# Patient Record
Sex: MALE | Race: WHITE | Employment: FULL TIME | URBAN - METROPOLITAN AREA
[De-identification: names, ages, dates, MRNs, and addresses within clinical notes are randomized per-mention and may not be internally consistent; named-entity substitution may affect disease eponyms.]

---

## 2017-01-30 ENCOUNTER — HOSPITAL ENCOUNTER (OUTPATIENT)
Dept: ULTRASOUND IMAGING | Age: 37
Discharge: HOME | End: 2017-01-30
Attending: FAMILY MEDICINE | Admitting: FAMILY MEDICINE

## 2017-01-30 DIAGNOSIS — B18.9 CHRONIC VIRAL HEPATITIS, UNSPECIFIED VIRAL HEPATITIS TYPE (HCC): ICD-10-CM

## 2017-01-30 DIAGNOSIS — B18.2 CHRONIC VIRAL HEPATITIS C (HCC): ICD-10-CM

## 2019-02-11 ENCOUNTER — OFFICE VISIT (OUTPATIENT)
Dept: PULMONOLOGY | Age: 39
End: 2019-02-11
Payer: COMMERCIAL

## 2019-02-11 VITALS
HEIGHT: 66 IN | BODY MASS INDEX: 19.93 KG/M2 | HEART RATE: 68 BPM | SYSTOLIC BLOOD PRESSURE: 124 MMHG | DIASTOLIC BLOOD PRESSURE: 78 MMHG | OXYGEN SATURATION: 99 % | WEIGHT: 124 LBS

## 2019-02-11 DIAGNOSIS — Z79.891 CHRONIC PRESCRIPTION OPIATE USE: Chronic | ICD-10-CM

## 2019-02-11 DIAGNOSIS — G47.31 CENTRAL SLEEP APNEA: Primary | ICD-10-CM

## 2019-02-11 DIAGNOSIS — K21.9 GERD WITHOUT ESOPHAGITIS: Chronic | ICD-10-CM

## 2019-02-11 PROBLEM — B18.2 CHRONIC HEPATITIS C (HCC): Status: ACTIVE | Noted: 2017-01-03

## 2019-02-11 PROBLEM — E78.5 HYPERLIPIDEMIA: Chronic | Status: ACTIVE | Noted: 2017-01-03

## 2019-02-11 PROBLEM — G47.37 CENTRAL SLEEP APNEA COMORBID WITH PRESCRIBED OPIOID USE: Status: ACTIVE | Noted: 2018-07-05

## 2019-02-11 PROBLEM — F11.90 CENTRAL SLEEP APNEA COMORBID WITH PRESCRIBED OPIOID USE: Status: ACTIVE | Noted: 2018-07-05

## 2019-02-11 PROBLEM — B18.2 CHRONIC HEPATITIS C (HCC): Chronic | Status: ACTIVE | Noted: 2017-01-03

## 2019-02-11 PROBLEM — E78.5 HYPERLIPIDEMIA: Status: ACTIVE | Noted: 2017-01-03

## 2019-02-11 PROCEDURE — G8427 DOCREV CUR MEDS BY ELIG CLIN: HCPCS | Performed by: INTERNAL MEDICINE

## 2019-02-11 PROCEDURE — 99244 OFF/OP CNSLTJ NEW/EST MOD 40: CPT | Performed by: INTERNAL MEDICINE

## 2019-02-11 PROCEDURE — G8484 FLU IMMUNIZE NO ADMIN: HCPCS | Performed by: INTERNAL MEDICINE

## 2019-02-11 PROCEDURE — G8420 CALC BMI NORM PARAMETERS: HCPCS | Performed by: INTERNAL MEDICINE

## 2019-02-11 ASSESSMENT — SLEEP AND FATIGUE QUESTIONNAIRES
NECK CIRCUMFERENCE (INCHES): 13
HOW LIKELY ARE YOU TO NOD OFF OR FALL ASLEEP WHILE WATCHING TV: 3
HOW LIKELY ARE YOU TO NOD OFF OR FALL ASLEEP WHILE LYING DOWN TO REST IN THE AFTERNOON WHEN CIRCUMSTANCES PERMIT: 3
HOW LIKELY ARE YOU TO NOD OFF OR FALL ASLEEP WHILE SITTING INACTIVE IN A PUBLIC PLACE: 3
HOW LIKELY ARE YOU TO NOD OFF OR FALL ASLEEP WHILE SITTING AND TALKING TO SOMEONE: 2
ESS TOTAL SCORE: 23
HOW LIKELY ARE YOU TO NOD OFF OR FALL ASLEEP WHILE SITTING AND READING: 3
HOW LIKELY ARE YOU TO NOD OFF OR FALL ASLEEP WHILE SITTING QUIETLY AFTER LUNCH WITHOUT ALCOHOL: 3
HOW LIKELY ARE YOU TO NOD OFF OR FALL ASLEEP IN A CAR, WHILE STOPPED FOR A FEW MINUTES IN TRAFFIC: 3
HOW LIKELY ARE YOU TO NOD OFF OR FALL ASLEEP WHEN YOU ARE A PASSENGER IN A CAR FOR AN HOUR WITHOUT A BREAK: 3

## 2019-02-11 ASSESSMENT — ENCOUNTER SYMPTOMS
ABDOMINAL DISTENTION: 0
CHEST TIGHTNESS: 0
RHINORRHEA: 0
ABDOMINAL PAIN: 0
PHOTOPHOBIA: 0
ALLERGIC/IMMUNOLOGIC NEGATIVE: 1
SHORTNESS OF BREATH: 0
APNEA: 1
NAUSEA: 0
CHOKING: 0
VOMITING: 0
EYE PAIN: 0

## 2020-07-09 ENCOUNTER — APPOINTMENT (OUTPATIENT)
Dept: CT IMAGING | Age: 40
End: 2020-07-09
Payer: COMMERCIAL

## 2020-07-09 ENCOUNTER — HOSPITAL ENCOUNTER (EMERGENCY)
Age: 40
Discharge: HOME | End: 2020-07-09
Attending: EMERGENCY MEDICINE
Payer: COMMERCIAL

## 2020-07-09 VITALS
OXYGEN SATURATION: 99 % | RESPIRATION RATE: 16 BRPM | HEART RATE: 86 BPM | TEMPERATURE: 96.9 F | SYSTOLIC BLOOD PRESSURE: 120 MMHG | DIASTOLIC BLOOD PRESSURE: 87 MMHG

## 2020-07-09 LAB
BILIRUBIN URINE: ABNORMAL
BLOOD, URINE: ABNORMAL
CLARITY: ABNORMAL
COLOR: ABNORMAL
EPITHELIAL CELLS, UA: 1 /HPF (ref 0–5)
GLUCOSE URINE: NEGATIVE MG/DL
HYALINE CASTS: 1 /LPF (ref 0–8)
KETONES, URINE: NEGATIVE MG/DL
LEUKOCYTE ESTERASE, URINE: ABNORMAL
MICROSCOPIC EXAMINATION: YES
NITRITE, URINE: NEGATIVE
PH UA: 6 (ref 5–8)
PROTEIN UA: 30 MG/DL
RBC UA: 658 /HPF (ref 0–4)
SPECIFIC GRAVITY UA: >1.03 (ref 1–1.03)
URINE REFLEX TO CULTURE: ABNORMAL
URINE TYPE: ABNORMAL
UROBILINOGEN, URINE: 1 E.U./DL
WBC UA: 4 /HPF (ref 0–5)

## 2020-07-09 PROCEDURE — 81001 URINALYSIS AUTO W/SCOPE: CPT

## 2020-07-09 PROCEDURE — 6360000002 HC RX W HCPCS: Performed by: EMERGENCY MEDICINE

## 2020-07-09 PROCEDURE — 6370000000 HC RX 637 (ALT 250 FOR IP): Performed by: EMERGENCY MEDICINE

## 2020-07-09 PROCEDURE — 96372 THER/PROPH/DIAG INJ SC/IM: CPT

## 2020-07-09 PROCEDURE — 99284 EMERGENCY DEPT VISIT MOD MDM: CPT

## 2020-07-09 PROCEDURE — 74176 CT ABD & PELVIS W/O CONTRAST: CPT

## 2020-07-09 RX ORDER — KETOROLAC TROMETHAMINE 30 MG/ML
60 INJECTION, SOLUTION INTRAMUSCULAR; INTRAVENOUS ONCE
Status: COMPLETED | OUTPATIENT
Start: 2020-07-09 | End: 2020-07-09

## 2020-07-09 RX ORDER — TAMSULOSIN HYDROCHLORIDE 0.4 MG/1
0.4 CAPSULE ORAL DAILY
Qty: 5 CAPSULE | Refills: 0 | Status: SHIPPED | OUTPATIENT
Start: 2020-07-09 | End: 2020-07-14

## 2020-07-09 RX ORDER — DEXTROAMPHETAMINE SACCHARATE, AMPHETAMINE ASPARTATE, DEXTROAMPHETAMINE SULFATE AND AMPHETAMINE SULFATE 5; 5; 5; 5 MG/1; MG/1; MG/1; MG/1
20 TABLET ORAL 2 TIMES DAILY
COMMUNITY

## 2020-07-09 RX ORDER — CEFDINIR 300 MG/1
300 CAPSULE ORAL ONCE
Status: COMPLETED | OUTPATIENT
Start: 2020-07-09 | End: 2020-07-09

## 2020-07-09 RX ORDER — ONDANSETRON 4 MG/1
4 TABLET, ORALLY DISINTEGRATING ORAL ONCE
Status: COMPLETED | OUTPATIENT
Start: 2020-07-09 | End: 2020-07-09

## 2020-07-09 RX ORDER — ONDANSETRON 4 MG/1
4 TABLET, ORALLY DISINTEGRATING ORAL EVERY 8 HOURS PRN
Qty: 15 TABLET | Refills: 0 | Status: SHIPPED | OUTPATIENT
Start: 2020-07-09

## 2020-07-09 RX ORDER — HYDROCODONE BITARTRATE AND ACETAMINOPHEN 5; 325 MG/1; MG/1
1 TABLET ORAL EVERY 4 HOURS PRN
Qty: 18 TABLET | Refills: 0 | Status: SHIPPED | OUTPATIENT
Start: 2020-07-09 | End: 2020-07-12

## 2020-07-09 RX ORDER — CEFDINIR 300 MG/1
300 CAPSULE ORAL 2 TIMES DAILY
Qty: 20 CAPSULE | Refills: 0 | Status: SHIPPED | OUTPATIENT
Start: 2020-07-09 | End: 2020-07-19

## 2020-07-09 RX ORDER — VENLAFAXINE 75 MG/1
75 TABLET ORAL DAILY
COMMUNITY

## 2020-07-09 RX ADMIN — KETOROLAC TROMETHAMINE 60 MG: 30 INJECTION, SOLUTION INTRAMUSCULAR at 05:37

## 2020-07-09 RX ADMIN — ONDANSETRON 4 MG: 4 TABLET, ORALLY DISINTEGRATING ORAL at 05:36

## 2020-07-09 RX ADMIN — CEFDINIR 300 MG: 300 CAPSULE ORAL at 05:37

## 2020-07-09 ASSESSMENT — PAIN SCALES - GENERAL
PAINLEVEL_OUTOF10: 10
PAINLEVEL_OUTOF10: 10

## 2020-07-09 NOTE — ED NOTES
Patient discharged at this time in no acute distress after verbalizing understanding of discharge instructions and need for follow up with PCP .   Pt left ambulatory to discharge area after reviewing and receiving copy of ov AVS.   Patient education  Learner- Patient and family  Motivation and readiness to learn- Medium to High  Barriers to learning- None  Learning preference/provided instructions- Both written and verbal discharge instructions     Nazanin Jeong RN  07/09/20 9810

## 2020-07-09 NOTE — ED PROVIDER NOTES
CHIEF COMPLAINT  Flank Pain (sudden onset of blood in urine and flank pain that started this afternoon. pt states pain worse and radiating downward. )      HISTORY OF PRESENT ILLNESS  Michael Bernal is a 44 y.o. male who presents to the ED with *report of hematuria and flank pain that started in the afternoon. Said the pain is gotten worse and is radiating downward into his groin and is worse on the left. No abdominal pain at this time. No other complaints, modifying factors or associated symptoms. I have reviewed the following from the nursing documentation.     Past Medical History:   Diagnosis Date    Arthritis     Central sleep apnea comorbid with prescribed opioid use 7/5/2018    Chronic hepatitis C ([facility] [address] 1/3/2017    Chronic prescription opiate use 5/25/2018    Gastritis     controlled with med    GERD without esophagitis 2/11/2019    Hyperlipidemia 1/3/2017     Past Surgical History:   Procedure Laterality Date    CHOLECYSTECTOMY      KNEE SURGERY      left, acl rep, mcl, scope    OTHER SURGICAL HISTORY      hx of diff to awaken     Family History   Problem Relation Age of Onset    Sleep Apnea Mother     Cancer Maternal Aunt     Cancer Maternal Uncle     Early Death Maternal Uncle     Diabetes Maternal Grandfather     Cancer Maternal Grandfather     Cancer Maternal Aunt      Social History     Socioeconomic History    Marital status:      Spouse name: Not on file    Number of children: Not on file    Years of education: Not on file    Highest education level: Not on file   Occupational History    Not on file   Social Needs    Financial resource strain: Not on file    Food insecurity     Worry: Not on file     Inability: Not on file    Transportation needs     Medical: Not on file     Non-medical: Not on file   Tobacco Use    Smoking status: Current Every Day Smoker     Packs/day: 2.50     Years: 18.00     Pack years: 45.00     Types: Cigarettes    Smokeless tobacco: Never Used    Tobacco comment: down to a pack a day   Substance and Sexual Activity    Alcohol use: No    Drug use: No    Sexual activity: Not on file   Lifestyle    Physical activity     Days per week: Not on file     Minutes per session: Not on file    Stress: Not on file   Relationships    Social connections     Talks on phone: Not on file     Gets together: Not on file     Attends Rastafarian service: Not on file     Active member of club or organization: Not on file     Attends meetings of clubs or organizations: Not on file     Relationship status: Not on file    Intimate partner violence     Fear of current or ex partner: Not on file     Emotionally abused: Not on file     Physically abused: Not on file     Forced sexual activity: Not on file   Other Topics Concern    Not on file   Social History Narrative    Not on file     No current facility-administered medications for this encounter. Current Outpatient Medications   Medication Sig Dispense Refill    amphetamine-dextroamphetamine (ADDERALL) 20 MG tablet Take 20 mg by mouth 2 times daily.  venlafaxine (EFFEXOR) 75 MG tablet Take 75 mg by mouth daily      Pancrelipase, Lip-Prot-Amyl, (CREON PO) Take 2,000 Units by mouth      HYDROcodone-acetaminophen (NORCO) 5-325 MG per tablet Take 1 tablet by mouth every 4 hours as needed for Pain for up to 3 days. Intended supply: 3 days. Take lowest dose possible to manage pain 18 tablet 0    ondansetron (ZOFRAN ODT) 4 MG disintegrating tablet Take 1 tablet by mouth every 8 hours as needed for Nausea or Vomiting 15 tablet 0    cefdinir (OMNICEF) 300 MG capsule Take 1 capsule by mouth 2 times daily for 10 days 20 capsule 0    tamsulosin (FLOMAX) 0.4 MG capsule Take 1 capsule by mouth daily for 5 days 5 capsule 0    methadone (DOLOPHINE) 10 MG tablet Take 52 mg by mouth daily.         Allergies   Allergen Reactions    Apexicon E [Diflorasone Diacetate]      Severe nausea and vomiting    Modafinil Hives    Morphine      Tongue swells    Penicillins      Itch/ hives       REVIEW OF SYSTEMS  10 systems reviewed, pertinent positives per HPI otherwise noted to be negative. PHYSICAL EXAM  /87   Pulse 86   Temp 96.9 °F (36.1 °C) (Temporal)   Resp 16   SpO2 99%   GENERAL APPEARANCE: Awake and alert. Cooperative. No acute distress. HEAD: Normocephalic. Atraumatic. EYES: PERRL. EOM's grossly intact. ENT: Mucous membranes are moist.   NECK: Supple. HEART: RRR. CHEST/LUNGS: Chest atraumatic, nontender, respirations unlabored. CTAB. Good air exchange. Speaking comfortably in full sentences. BACK: No midline spinal tenderness or step-off. ABDOMEN: Soft. Non-distended. Non-tender. No guarding or rebound. Normal bowel sounds. EXTREMITIES: No peripheral edema. Moves all extremities equally. All extremities neurovascularly intact. RECTAL/: Deferred  SKIN: Warm and dry. No acute rashes. NEUROLOGICAL: Alert and oriented. CN 2-12 intact, No gross facial drooping. Strength 5/5, sensation intact. Normal coordination. Gait normal.   PSYCHIATRIC: Normal mood and affect. LABS  I have reviewed all labs for this visit.    Results for orders placed or performed during the hospital encounter of 07/09/20   Urinalysis Reflex to Culture   Result Value Ref Range    Color, UA DK YELLOW Straw/Yellow    Clarity, UA CLOUDY (A) Clear    Glucose, Ur Negative Negative mg/dL    Bilirubin Urine SMALL (A) Negative    Ketones, Urine Negative Negative mg/dL    Specific Gravity, UA >1.030 1.005 - 1.030    Blood, Urine LARGE (A) Negative    pH, UA 6.0 5.0 - 8.0    Protein, UA 30 (A) Negative mg/dL    Urobilinogen, Urine 1.0 <2.0 E.U./dL    Nitrite, Urine Negative Negative    Leukocyte Esterase, Urine TRACE (A) Negative    Microscopic Examination YES     Urine Type Voided     Urine Reflex to Culture Not Indicated    Microscopic Urinalysis   Result Value Ref Range    Hyaline Casts, UA 1 0 - 8 /LPF    WBC, UA 4 0 - 5 /HPF RBC,  (H) 0 - 4 /HPF    Epithelial Cells, UA 1 0 - 5 /HPF     RADIOLOGY  X-RAYS:  I have reviewed radiologic plain film image(s). ALL OTHER NON-PLAIN FILM IMAGES SUCH AS CT, ULTRASOUND AND MRI HAVE BEEN READ BY THE RADIOLOGIST. CT ABDOMEN PELVIS WO CONTRAST Additional Contrast? None   Preliminary Result   1. No finding to account for patient's flank pain or hematuria. No evidence   of obstructive uropathy. 2. No evidence of appendiceal inflammation. 3. Cholecystectomy. Branching lucencies in the liver probably represents   pneumobilia. ED COURSE/MDM  Patient seen and evaluated. I estimate there is LOW risk for ACUTE CORONARY SYNDROME, INTRACRANIAL HEMORRHAGE, MALIGNANT DYSRHYTHMIA, MENINGITIS, PNEUMONIA, PULMONARY EMBOLISM, SEPSIS, SUBARACHNOID HEMORRHAGE, SUBDURAL HEMATOMA, STROKE, or URINARY TRACT INFECTION, thus I consider the discharge disposition reasonable. Amelie Ferreira and I have discussed the diagnosis and risks, and we agree with discharging home to follow-up with their primary doctor. We also discussed returning to the Emergency Department immediately if new or worsening symptoms occur. We have discussed the symptoms which are most concerning (e.g., changing or worsening pain, weakness, vomiting, fever) that necessitate immediate return. All diagnostic tests reviewed and results discussed with patient. Plan of care discussed with patient. Patient in agreement with plan. Discharge Medication List as of 7/9/2020  6:03 AM      START taking these medications    Details   HYDROcodone-acetaminophen (NORCO) 5-325 MG per tablet Take 1 tablet by mouth every 4 hours as needed for Pain for up to 3 days. Intended supply: 3 days.  Take lowest dose possible to manage pain, Disp-18 tablet, R-0Print      ondansetron (ZOFRAN ODT) 4 MG disintegrating tablet Take 1 tablet by mouth every 8 hours as needed for Nausea or Vomiting, Disp-15 tablet, R-0Print      cefdinir (OMNICEF) 300 MG capsule Take 1 capsule by mouth 2 times daily for 10 days, Disp-20 capsule, R-0Print      tamsulosin (FLOMAX) 0.4 MG capsule Take 1 capsule by mouth daily for 5 days, Disp-5 capsule, R-0Print             CLINICAL IMPRESSION  1. Kidney stone        Blood pressure 120/87, pulse 86, temperature 96.9 °F (36.1 °C), temperature source Temporal, resp. rate 16, SpO2 99 %. DISPOSITION  Maximilian Quick was discharged to home in stable condition. This chart was generated in part by using Dragon Dictation system and may contain errors related to that system including errors in grammar, punctuation, and spelling, as well as words and phrases that may be inappropriate. When dictating, effort is made to correct spelling/grammar errors. If there are any questions or concerns please feel free to contact the dictating provider for clarification.      Natasha Shaffer DO  ATTENDING, [id number] [place] [address],   07/09/20 7876

## 2023-07-08 ENCOUNTER — HOSPITAL ENCOUNTER (EMERGENCY)
Age: 43
Discharge: HOME | End: 2023-07-08
Payer: COMMERCIAL

## 2023-07-08 VITALS
TEMPERATURE: 98.2 F | WEIGHT: 123.9 LBS | SYSTOLIC BLOOD PRESSURE: 114 MMHG | OXYGEN SATURATION: 100 % | RESPIRATION RATE: 17 BRPM | DIASTOLIC BLOOD PRESSURE: 80 MMHG | BODY MASS INDEX: 20 KG/M2 | HEART RATE: 80 BPM

## 2023-07-08 DIAGNOSIS — H10.32 ACUTE CONJUNCTIVITIS OF LEFT EYE, UNSPECIFIED ACUTE CONJUNCTIVITIS TYPE: Primary | ICD-10-CM

## 2023-07-08 PROCEDURE — 99283 EMERGENCY DEPT VISIT LOW MDM: CPT

## 2023-07-08 PROCEDURE — 6370000000 HC RX 637 (ALT 250 FOR IP): Performed by: PHYSICIAN ASSISTANT

## 2023-07-08 RX ORDER — ERYTHROMYCIN 5 MG/G
OINTMENT OPHTHALMIC
Qty: 3.5 G | Refills: 0 | Status: SHIPPED | OUTPATIENT
Start: 2023-07-08

## 2023-07-08 RX ORDER — TETRACAINE HYDROCHLORIDE 5 MG/ML
1 SOLUTION OPHTHALMIC ONCE
Status: COMPLETED | OUTPATIENT
Start: 2023-07-08 | End: 2023-07-08

## 2023-07-08 RX ADMIN — FLUORESCEIN SODIUM 1 MG: 1 STRIP OPHTHALMIC at 11:27

## 2023-07-08 RX ADMIN — TETRACAINE HYDROCHLORIDE 1 DROP: 5 SOLUTION OPHTHALMIC at 11:27

## 2023-07-08 ASSESSMENT — VISUAL ACUITY
OD: 20/10
OS: 20/25
OU: 20/10

## 2023-07-08 NOTE — ED NOTES
Pt discharged at this time. Discharge instructions and medications reviewed,  Questions were answered. PT verbalized understanding. VSS, Afebrile. Follow up appointments were discussed.            Clearance LEE Latif  07/08/23 4791

## 2023-07-08 NOTE — ED PROVIDER NOTES
Given by Other 7/8/23 1127)             Patient was nontoxic, well appearing, with normal vital signs ***. Differential diagnosis includes ***    ***      {Social Determinants (Optional):34797}    Chronic Conditions:       I am the Primary Clinician of Record. Is this patient to be included in the SEP-1 Core Measure due to severe sepsis or septic shock? No   Exclusion criteria - the patient is NOT to be included for SEP-1 Core Measure due to:  2+ SIRS criteria are not met    PROCEDURES   Unless otherwise noted below, none     Procedures    FINAL IMPRESSION      1.  Acute conjunctivitis of left eye, unspecified acute conjunctivitis type          DISPOSITION/PLAN       DISPOSITION Discharge - Pending Orders Complete 07/08/2023 11:37:02 AM      PATIENT REFERRED TO:  [address] [place] [address]    Schedule an appointment as soon as possible for a visit   for reevaluation    [facility] Emergency Department  [address] [place] [address] Drive  [phone]    As needed, If symptoms worsen      DISCHARGE MEDICATIONS:  New Prescriptions    ERYTHROMYCIN (ROMYCIN) 5 MG/GM OPHTHALMIC OINTMENT    Apply pea size amount to left eyes 4 times a day for 7 days       DISCONTINUED MEDICATIONS:  Discontinued Medications    No medications on file              (Please note that portions of this note were completed with a voice recognition program.  Efforts were made to edit the dictations but occasionally words are mis-transcribed.)    MOJGAN Falk (electronically signed)